# Patient Record
Sex: FEMALE | Race: WHITE | NOT HISPANIC OR LATINO | URBAN - METROPOLITAN AREA
[De-identification: names, ages, dates, MRNs, and addresses within clinical notes are randomized per-mention and may not be internally consistent; named-entity substitution may affect disease eponyms.]

---

## 2023-10-18 ENCOUNTER — TELEPHONE (OUTPATIENT)
Dept: PSYCHIATRY | Facility: CLINIC | Age: 38
End: 2023-10-18

## 2023-10-18 NOTE — TELEPHONE ENCOUNTER
A beto called in on behalf for ms. Melton. Beto asked if nurys was on the wait list told her I didn't see pt on the list that I would pass the info over for her to be looked into.  I also mentioned there was no referral either.

## 2024-03-27 ENCOUNTER — TELEPHONE (OUTPATIENT)
Dept: PSYCHIATRY | Facility: CLINIC | Age: 39
End: 2024-03-27

## 2024-03-27 NOTE — TELEPHONE ENCOUNTER
Jennifer from [facility] called in to see how long pt would be on wait list. I told her pt wasn't on the wait list and that there is no referral and also we aren't taking any new pts with medicaid. She said she keeps getting the run around. I told her she and I spoke in oct. I told her then there was no referral and the we aren't taking np with medicaid.